# Patient Record
Sex: MALE | Race: WHITE | NOT HISPANIC OR LATINO | Employment: UNEMPLOYED | URBAN - METROPOLITAN AREA
[De-identification: names, ages, dates, MRNs, and addresses within clinical notes are randomized per-mention and may not be internally consistent; named-entity substitution may affect disease eponyms.]

---

## 2018-08-20 ENCOUNTER — HOSPITAL ENCOUNTER (EMERGENCY)
Facility: HOSPITAL | Age: 33
Discharge: LEFT BEFORE DISCHARGE | End: 2018-08-20

## 2018-08-20 VITALS
DIASTOLIC BLOOD PRESSURE: 87 MMHG | SYSTOLIC BLOOD PRESSURE: 129 MMHG | HEART RATE: 70 BPM | HEIGHT: 69 IN | TEMPERATURE: 98.4 F | BODY MASS INDEX: 35.55 KG/M2 | WEIGHT: 240 LBS | OXYGEN SATURATION: 100 % | RESPIRATION RATE: 20 BRPM

## 2024-05-28 ENCOUNTER — LAB (OUTPATIENT)
Dept: LAB | Facility: HOSPITAL | Age: 39
End: 2024-05-28
Payer: MEDICAID

## 2024-05-28 ENCOUNTER — OFFICE VISIT (OUTPATIENT)
Dept: FAMILY MEDICINE CLINIC | Facility: CLINIC | Age: 39
End: 2024-05-28
Payer: MEDICAID

## 2024-05-28 ENCOUNTER — TELEPHONE (OUTPATIENT)
Dept: FAMILY MEDICINE CLINIC | Facility: CLINIC | Age: 39
End: 2024-05-28
Payer: MEDICAID

## 2024-05-28 VITALS
HEIGHT: 69 IN | WEIGHT: 201 LBS | HEART RATE: 64 BPM | TEMPERATURE: 98.6 F | DIASTOLIC BLOOD PRESSURE: 74 MMHG | OXYGEN SATURATION: 98 % | SYSTOLIC BLOOD PRESSURE: 126 MMHG | BODY MASS INDEX: 29.77 KG/M2

## 2024-05-28 DIAGNOSIS — B19.20 HEPATITIS C VIRUS INFECTION WITHOUT HEPATIC COMA, UNSPECIFIED CHRONICITY: Primary | ICD-10-CM

## 2024-05-28 DIAGNOSIS — B19.20 HEPATITIS C VIRUS INFECTION WITHOUT HEPATIC COMA, UNSPECIFIED CHRONICITY: ICD-10-CM

## 2024-05-28 DIAGNOSIS — G62.9 NEUROPATHY: ICD-10-CM

## 2024-05-28 DIAGNOSIS — Z79.899 HIGH RISK MEDICATION USE: ICD-10-CM

## 2024-05-28 DIAGNOSIS — Z72.0 TOBACCO USE: ICD-10-CM

## 2024-05-28 LAB
DEPRECATED RDW RBC AUTO: 39.8 FL (ref 37–54)
ERYTHROCYTE [DISTWIDTH] IN BLOOD BY AUTOMATED COUNT: 12.6 % (ref 12.3–15.4)
HCT VFR BLD AUTO: 40.6 % (ref 37.5–51)
HGB BLD-MCNC: 13.9 G/DL (ref 13–17.7)
MCH RBC QN AUTO: 29.8 PG (ref 26.6–33)
MCHC RBC AUTO-ENTMCNC: 34.2 G/DL (ref 31.5–35.7)
MCV RBC AUTO: 86.9 FL (ref 79–97)
PLATELET # BLD AUTO: 187 10*3/MM3 (ref 140–450)
PMV BLD AUTO: 10.9 FL (ref 6–12)
RBC # BLD AUTO: 4.67 10*6/MM3 (ref 4.14–5.8)
WBC NRBC COR # BLD AUTO: 5.02 10*3/MM3 (ref 3.4–10.8)

## 2024-05-28 PROCEDURE — 80048 BASIC METABOLIC PNL TOTAL CA: CPT

## 2024-05-28 PROCEDURE — 1160F RVW MEDS BY RX/DR IN RCRD: CPT | Performed by: PHYSICIAN ASSISTANT

## 2024-05-28 PROCEDURE — 99204 OFFICE O/P NEW MOD 45 MIN: CPT | Performed by: PHYSICIAN ASSISTANT

## 2024-05-28 PROCEDURE — 87522 HEPATITIS C REVRS TRNSCRPJ: CPT

## 2024-05-28 PROCEDURE — 1159F MED LIST DOCD IN RCRD: CPT | Performed by: PHYSICIAN ASSISTANT

## 2024-05-28 PROCEDURE — 36415 COLL VENOUS BLD VENIPUNCTURE: CPT

## 2024-05-28 PROCEDURE — 84443 ASSAY THYROID STIM HORMONE: CPT

## 2024-05-28 PROCEDURE — 1126F AMNT PAIN NOTED NONE PRSNT: CPT | Performed by: PHYSICIAN ASSISTANT

## 2024-05-28 PROCEDURE — 80076 HEPATIC FUNCTION PANEL: CPT

## 2024-05-28 PROCEDURE — 82607 VITAMIN B-12: CPT

## 2024-05-28 PROCEDURE — 85027 COMPLETE CBC AUTOMATED: CPT

## 2024-05-28 RX ORDER — GABAPENTIN 400 MG/1
400 CAPSULE ORAL 3 TIMES DAILY
Qty: 90 CAPSULE | Refills: 2 | Status: SHIPPED | OUTPATIENT
Start: 2024-05-28

## 2024-05-28 RX ORDER — BUPRENORPHINE HYDROCHLORIDE AND NALOXONE HYDROCHLORIDE DIHYDRATE 8; 2 MG/1; MG/1
TABLET SUBLINGUAL
COMMUNITY
Start: 2024-05-14

## 2024-05-28 RX ORDER — HYDROXYZINE PAMOATE 50 MG/1
CAPSULE ORAL
COMMUNITY
Start: 2024-05-24

## 2024-05-28 RX ORDER — UREA 10 %
LOTION (ML) TOPICAL
COMMUNITY
Start: 2024-05-24

## 2024-05-28 NOTE — PROGRESS NOTES
New Patient Office Visit    Date: 2024   Patient Name: Armaan Ivy  : 1985   MRN: [id number]     Chief Complaint:    Chief Complaint   Patient presents with    Establish Care    Hepatitis C     Blood work from the sober living    Liver Eval     Blood work from the sober living       History of Present Illness: Armaan Ivy is a 39 y.o. male who is here today to establish care.    History of Present Illness  The patient comes in today to get established.     The patient acknowledges having undergone blood work during his time at [facility], however, he has not yet undergone a liver or cancer screening. He was diagnosed with Hepatitis C and was undergoing treatment, which was discontinued approximately a month ago. This is his third episode of Hepatitis C, with the initial two episodes being asymptomatic. Currently, he experiences severe itching, fatigue, lethargy, and nausea.    The patient, a  for 17 years, reports persistent knee pain. He has previously undergone surgery on his knee and has attended physical therapy classes. He experiences daily pain, which he attributes to his long-standing arthritis. He has been on Suboxone for the past 2 months. He occasionally experiences difficulty breathing, but his appetite remains unaffected. He has not observed any jaundice. He occasionally experiences swelling. He was previously prescribed gabapentin 400 mg thrice daily following his carpal tunnel injections and his knee surgery, which alleviated the nerve pain. However, he continues to experience pain in his elbow, which radiates up his arm, and his hand frequently falls asleep. He did have EMG studies as well.     Currently in sober living, and has been on Suboxone for 2 months. He states he is doing well with the suboxone, it controls his cravings for heroin. Also used fentanyl in the past.    Subjective    Review of Systems:   Review of Systems     Past Medical History:   Past  "Medical History:   Diagnosis Date    Anxiety     Depression     Hepatitis C antibody positive in blood     Swimmer's ear        Past Surgical History:   Past Surgical History:   Procedure Laterality Date    KNEE SURGERY Left        Family History:   Family History   Problem Relation Age of Onset    COPD Mother     Hypertension Father     Heart disease Father     Cancer Father     Obesity Sister     Diabetes Maternal Grandfather     Diabetes Paternal Grandmother     Cancer Paternal Grandmother        Social History:   Social History     Socioeconomic History    Marital status:    Tobacco Use    Smoking status: Every Day     Current packs/day: 1.00     Average packs/day: 1 pack/day for 28.4 years (28.4 ttl pk-yrs)     Types: Cigarettes     Start date: 1996    Smokeless tobacco: Never   Vaping Use    Vaping status: Every Day    Start date: 4/8/2024    Substances: Nicotine    Devices: Disposable   Substance and Sexual Activity    Alcohol use: Not Currently    Drug use: Yes     Types: Methamphetamines, Codeine, \"Crack\" cocaine    Sexual activity: Defer       Medications:     Current Outpatient Medications:     buprenorphine-naloxone (SUBOXONE) 8-2 MG per SL tablet, , Disp: , Rfl:     hydrOXYzine pamoate (VISTARIL) 50 MG capsule, , Disp: , Rfl:     ibuprofen (ADVIL,MOTRIN) 800 MG tablet, Take 1 tablet by mouth Every 6 (Six) Hours As Needed for Mild Pain., Disp: , Rfl:     melatonin 5 MG tablet tablet, , Disp: , Rfl:     gabapentin (NEURONTIN) 400 MG capsule, Take 1 capsule by mouth 3 (Three) Times a Day., Disp: 90 capsule, Rfl: 2    Allergies:   Allergies   Allergen Reactions    Codeine GI Intolerance    Mobic [Meloxicam] Diarrhea    Robaxin [Methocarbamol] Diarrhea       Objective   Vital Signs:   Vitals:    05/28/24 1348   BP: 126/74   Pulse: 64   Temp: 98.6 °F (37 °C)   TempSrc: Infrared   SpO2: 98%   Weight: 91.2 kg (201 lb)   Height: 175.3 cm (69.02\")   PainSc: 0-No pain     Body mass index is 29.67 kg/m².    "       Physical Exam:   Physical Exam  Vitals and nursing note reviewed.   Constitutional:       Appearance: Normal appearance.   HENT:      Head: Normocephalic and atraumatic.      Right Ear: Tympanic membrane and ear canal normal.      Left Ear: Tympanic membrane and ear canal normal.      Nose: No congestion or rhinorrhea.      Mouth/Throat:      Mouth: Mucous membranes are moist.      Pharynx: Oropharynx is clear. No posterior oropharyngeal erythema.   Cardiovascular:      Rate and Rhythm: Normal rate and regular rhythm.   Pulmonary:      Effort: Pulmonary effort is normal.      Breath sounds: Normal breath sounds. No decreased breath sounds, wheezing, rhonchi or rales.   Abdominal:      General: Bowel sounds are normal.      Palpations: Abdomen is soft. There is no hepatomegaly or splenomegaly.      Tenderness: There is no abdominal tenderness. There is no guarding or rebound.   Musculoskeletal:      Cervical back: Neck supple.      Right lower leg: No edema.      Left lower leg: No edema.   Lymphadenopathy:      Cervical: No cervical adenopathy.   Neurological:      Mental Status: He is alert.          Procedures     Assessment / Plan    Assessment/Plan:   Diagnoses and all orders for this visit:    1. Hepatitis C virus infection without hepatic coma, unspecified chronicity (Primary)  -     Hepatic Function Panel; Future  -     Hepatitis C RNA, Quantitative, PCR (graph); Future  -     TSH; Future  -     Basic metabolic panel; Future  -     CBC No Differential; Future  -     Vitamin B12; Future  -     Ambulatory Referral to Gastroenterology    2. Tobacco use    3. Neuropathy  -     gabapentin (NEURONTIN) 400 MG capsule; Take 1 capsule by mouth 3 (Three) Times a Day.  Dispense: 90 capsule; Refill: 2    4. High risk medication use  -     Compliance Drug Analysis, Ur - Urine, Clean Catch; Future       Assessment & Plan  1. Establishment of care.  A urine drug screen was conducted today.    2. Hepatitis  C.  Laboratory tests will be conducted to assess the status of the patient's hepatitis C, and a referral to gastroenterology for further Hepatitis C treatment will be initiated.    3. Neuropathy.  The patient will be recommenced on gabapentin to manage his neuropathy, a consequence of carpal tunnel issues and knee surgery.    Follow-up  The patient is scheduled for a follow-up visit in 3 months.     IVAN reviewed and is appropriate. UDS done today. CSA signed. Side effects and benefits of medications discussed, including risks of dizziness, falls, sleepiness, respiratory depression, overdose and death. He will inform his sober living counselors of medication use. He continues to have frequent drugs screens there as well as part of his suboxone treatment.     Follow Up:   Return in about 3 months (around 8/28/2024) for medication, Recheck.    Patient or patient representative verbalized consent for the use of Ambient Listening during the visit with  Katharina Rodsa PA-C for chart documentation. 5/28/2024  15:04 EDT    Katharina Rodas PA-C   [facility]

## 2024-05-29 LAB
ALBUMIN SERPL-MCNC: 4.5 G/DL (ref 3.5–5.2)
ALP SERPL-CCNC: 45 U/L (ref 39–117)
ALT SERPL W P-5'-P-CCNC: 107 U/L (ref 1–41)
ANION GAP SERPL CALCULATED.3IONS-SCNC: 11 MMOL/L (ref 5–15)
AST SERPL-CCNC: 66 U/L (ref 1–40)
BILIRUB CONJ SERPL-MCNC: <0.2 MG/DL (ref 0–0.3)
BILIRUB INDIRECT SERPL-MCNC: ABNORMAL MG/DL
BILIRUB SERPL-MCNC: 0.4 MG/DL (ref 0–1.2)
BUN SERPL-MCNC: 14 MG/DL (ref 6–20)
BUN/CREAT SERPL: 17.1 (ref 7–25)
CALCIUM SPEC-SCNC: 9.3 MG/DL (ref 8.6–10.5)
CHLORIDE SERPL-SCNC: 102 MMOL/L (ref 98–107)
CO2 SERPL-SCNC: 25 MMOL/L (ref 22–29)
CREAT SERPL-MCNC: 0.82 MG/DL (ref 0.76–1.27)
EGFRCR SERPLBLD CKD-EPI 2021: 115.3 ML/MIN/1.73
GLUCOSE SERPL-MCNC: 99 MG/DL (ref 65–99)
POTASSIUM SERPL-SCNC: 4.8 MMOL/L (ref 3.5–5.2)
PROT SERPL-MCNC: 7.5 G/DL (ref 6–8.5)
SODIUM SERPL-SCNC: 138 MMOL/L (ref 136–145)
TSH SERPL DL<=0.05 MIU/L-ACNC: 2.04 UIU/ML (ref 0.27–4.2)
VIT B12 BLD-MCNC: 797 PG/ML (ref 211–946)

## 2024-05-30 ENCOUNTER — PATIENT ROUNDING (BHMG ONLY) (OUTPATIENT)
Dept: FAMILY MEDICINE CLINIC | Facility: CLINIC | Age: 39
End: 2024-05-30
Payer: MEDICAID

## 2024-05-31 LAB
HCV RNA SERPL NAA+PROBE-ACNC: NORMAL IU/ML
HCV RNA SERPL NAA+PROBE-LOG IU: 6.16 LOG10 IU/ML
TEST INFORMATION: NORMAL

## 2024-06-03 LAB — DRUGS UR: NORMAL

## 2024-07-08 NOTE — TELEPHONE ENCOUNTER
Caller: Nick Ivysilverio    Relationship: Self    Best call back number: [phone]     Requested Prescriptions:   Requested Prescriptions     Pending Prescriptions Disp Refills    ibuprofen (ADVIL,MOTRIN) 800 MG tablet       Sig: Take 1 tablet by mouth Every 6 (Six) Hours As Needed for Mild Pain.        Pharmacy where request should be sent: [facility] [address] [facility] [phone] [facility] [phone] FX     Last office visit with prescribing clinician: 5/28/2024   Last telemedicine visit with prescribing clinician: Visit date not found   Next office visit with prescribing clinician: Visit date not found     Does the patient have less than a 3 day supply:  [x] Yes  [] No    Would you like a call back once the refill request has been completed: [x] Yes [] No    If the office needs to give you a call back, can they leave a voicemail: [x] Yes [] No    Wilbur Sanders Rep   07/08/24 16:59 EDT

## 2024-07-09 RX ORDER — IBUPROFEN 800 MG/1
800 TABLET ORAL EVERY 8 HOURS PRN
Qty: 100 TABLET | Refills: 3 | Status: SHIPPED | OUTPATIENT
Start: 2024-07-09

## 2024-08-30 ENCOUNTER — OFFICE VISIT (OUTPATIENT)
Dept: FAMILY MEDICINE CLINIC | Facility: CLINIC | Age: 39
End: 2024-08-30
Payer: MEDICAID

## 2024-08-30 VITALS
HEIGHT: 69 IN | WEIGHT: 243.5 LBS | DIASTOLIC BLOOD PRESSURE: 70 MMHG | RESPIRATION RATE: 15 BRPM | SYSTOLIC BLOOD PRESSURE: 110 MMHG | HEART RATE: 76 BPM | OXYGEN SATURATION: 98 % | BODY MASS INDEX: 36.07 KG/M2

## 2024-08-30 DIAGNOSIS — Z00.00 ANNUAL PHYSICAL EXAM: Primary | ICD-10-CM

## 2024-08-30 DIAGNOSIS — Z23 NEED FOR VACCINATION: ICD-10-CM

## 2024-08-30 DIAGNOSIS — Z01.89 PATIENT REQUEST FOR DIAGNOSTIC TESTING: ICD-10-CM

## 2024-08-30 PROBLEM — T84.89XA ACL GRAFT TEAR: Status: ACTIVE | Noted: 2017-02-06

## 2024-08-30 PROBLEM — M25.539 WRIST PAIN: Status: ACTIVE | Noted: 2018-03-19

## 2024-08-30 PROBLEM — A41.9 SEPSIS: Status: ACTIVE | Noted: 2019-08-12

## 2024-08-30 LAB
BUPRENORPHINE: POSITIVE
MDMA: NEGATIVE
MORPHINE/OPIATES SCREEN, URINE: NEGATIVE
PCP: NEGATIVE
POC AMPHETAMINES: NEGATIVE
POC BENZODIAZEPHINES: NEGATIVE
POC COCAINE: NEGATIVE
POC METHADONE: NEGATIVE
POC METHAMPHETAMINE SCREEN URINE: NEGATIVE
POC OPIATES: NEGATIVE
POC OXYCODONE: NEGATIVE
POC THC: NEGATIVE

## 2024-08-30 PROCEDURE — 2014F MENTAL STATUS ASSESS: CPT

## 2024-08-30 PROCEDURE — 36415 COLL VENOUS BLD VENIPUNCTURE: CPT

## 2024-08-30 PROCEDURE — 90460 IM ADMIN 1ST/ONLY COMPONENT: CPT

## 2024-08-30 PROCEDURE — 1159F MED LIST DOCD IN RCRD: CPT

## 2024-08-30 PROCEDURE — 99385 PREV VISIT NEW AGE 18-39: CPT

## 2024-08-30 PROCEDURE — 1126F AMNT PAIN NOTED NONE PRSNT: CPT

## 2024-08-30 PROCEDURE — 1160F RVW MEDS BY RX/DR IN RCRD: CPT

## 2024-08-30 PROCEDURE — 90715 TDAP VACCINE 7 YRS/> IM: CPT

## 2024-08-30 RX ORDER — QUETIAPINE FUMARATE 100 MG/1
100 TABLET, FILM COATED ORAL NIGHTLY
COMMUNITY
Start: 2024-08-26

## 2024-08-30 NOTE — PATIENT INSTRUCTIONS

## 2024-08-30 NOTE — PROGRESS NOTES
"     New Patient Office Visit      Date: 24   Patient Name: Armaan Ivy  : 1985   MRN: [id number]     Chief Complaint   Patient presents with    [facility] Care    Med Refill     Patient would like to discuss all medications. Patient would like to do a UDS as he has been told the ones he has done in another clinic were positive.        History of Present Illness: Armaan Ivy is a 39 y.o. male who is here today for a get acquainted visit to establish care with a new provider. I have reviewed the patient's medical history and updated the computerized patient record. Baseline screening tests were discussed today and pt agrees to discuss health maintenance and goals in future appointments.    HPI Notes:  Never had PCP  Pt is on probation, living at [facility]  Released this past Friday from [facility] to [facility] - Buprenorphine  Pt wants a urine drug screen today to take to [facility]  Denies current drug use, denies alcohol  Drinks only Mt Dew during the day - doesn't drink water r/t \"it's contaminated\"     Subjective     Past Medical History:   Diagnosis Date    Anxiety     Depression     Hepatitis C antibody positive in blood     Swimmer's ear        Past Surgical History:   Procedure Laterality Date    KNEE SURGERY Left        Family History   Problem Relation Age of Onset    COPD Mother     Hypertension Father     Heart disease Father     Cancer Father     Obesity Sister     Diabetes Maternal Grandfather     Diabetes Paternal Grandmother     Cancer Paternal Grandmother        Social History     Socioeconomic History    Marital status:    Tobacco Use    Smoking status: Every Day     Current packs/day: 1.00     Average packs/day: 1 pack/day for 28.7 years (28.7 ttl pk-yrs)     Types: Cigarettes     Start date:     Smokeless tobacco: Never   Vaping Use    Vaping status: Every Day    Start date: 2024    Substances: Nicotine    Devices: Disposable " "  Substance and Sexual Activity    Alcohol use: Not Currently    Drug use: Yes     Types: Methamphetamines, Codeine, \"Crack\" cocaine    Sexual activity: Defer       Current Outpatient Medications:     buprenorphine-naloxone (SUBOXONE) 8-2 MG per SL tablet, , Disp: , Rfl:     gabapentin (NEURONTIN) 400 MG capsule, Take 1 capsule by mouth 3 (Three) Times a Day., Disp: 90 capsule, Rfl: 2    hydrOXYzine pamoate (VISTARIL) 50 MG capsule, , Disp: , Rfl:     ibuprofen (ADVIL,MOTRIN) 800 MG tablet, Take 1 tablet by mouth Every 8 (Eight) Hours As Needed for Mild Pain or Moderate Pain., Disp: 100 tablet, Rfl: 3    melatonin 5 MG tablet tablet, , Disp: , Rfl:     QUEtiapine (SEROquel) 100 MG tablet, Take 1 tablet by mouth Every Night., Disp: , Rfl:       Allergies   Allergen Reactions    Codeine GI Intolerance    Mobic [Meloxicam] Diarrhea    Robaxin [Methocarbamol] Diarrhea       Objective     Vitals:    08/30/24 1129   BP: 110/70   BP Location: Left arm   Patient Position: Sitting   Cuff Size: Large Adult   Pulse: 76   Resp: 15   SpO2: 98%   Weight: 110 kg (243 lb 8 oz)   Height: 175.3 cm (69.02\")          Physical Exam  Vitals and nursing note reviewed.   Constitutional:       General: He is not in acute distress.     Appearance: He is not ill-appearing.   HENT:      Head: Normocephalic.      Right Ear: Tympanic membrane normal.      Left Ear: Tympanic membrane normal.      Nose: Nose normal.      Mouth/Throat:      Mouth: Mucous membranes are moist.      Pharynx: No oropharyngeal exudate or posterior oropharyngeal erythema.   Eyes:      Pupils: Pupils are equal, round, and reactive to light.   Cardiovascular:      Rate and Rhythm: Normal rate and regular rhythm.      Pulses: Normal pulses.      Heart sounds: Normal heart sounds. No murmur heard.  Pulmonary:      Effort: Pulmonary effort is normal.      Breath sounds: Normal breath sounds.   Abdominal:      General: Bowel sounds are normal.      Palpations: Abdomen is soft. "      Tenderness: There is no abdominal tenderness.   Musculoskeletal:         General: Normal range of motion.      Cervical back: Normal range of motion and neck supple. No tenderness.      Right lower leg: No edema.      Left lower leg: No edema.   Lymphadenopathy:      Cervical: No cervical adenopathy.   Skin:     General: Skin is warm and dry.      Capillary Refill: Capillary refill takes less than 2 seconds.   Neurological:      General: No focal deficit present.      Mental Status: He is alert and oriented to person, place, and time.      Gait: Gait normal.   Psychiatric:         Attention and Perception: Attention normal.         Mood and Affect: Mood normal.         Speech: Speech is rapid and pressured.         Behavior: Behavior normal. Behavior is hyperactive.         Thought Content: Thought content does not include suicidal ideation.         Cognition and Memory: Cognition normal.         Judgment: Judgment is impulsive.     PHQ-9 Total Score: 0     Assessment / Plan      Diagnoses and all orders for this visit:    1. Annual physical exam (Primary)  Assessment & Plan:  Armaan Ivy presents today to establish care with a new provider and complete annual wellness exam.    Armaan's main concerns today are: Establishing care with PCP, obtaining urine drug screen  Available medical records reviewed with him today.   Appropriate diagnostic testing ordered.  Medication refills provided per patient request.  Patient agrees to follow-up in 2 weeks to discuss diagnostic testing results, discuss health goals,  and address care gaps.    Orders:  -     CBC Auto Differential  -     Comprehensive Metabolic Panel  -     Hemoglobin A1c  -     Lipid Panel  -     TSH Rfx On Abnormal To Free T4    2. Need for vaccination  -     Tdap Vaccine Greater Than or Equal To 8yo IM    3. Patient request for diagnostic testing  Assessment & Plan:  Patient mention failing recent drug screen at sober living house  Denies  illicit drug use  Patient requests UDS today-we discussed his purpose for seeking test  Discussed with patient my belief that point-of-care drug screen today would likely be of little benefit but patient insists  Point-of-care UDS completed at patient's request.    Orders:  -     POC Urine Drug Screen, Triage       Body mass index is 35.94 kg/m².     Patient Education:   Reviewed medications, potential side effects and signs and symptoms to report.   Discussed risk versus benefits of treatment plan with patient and/or family-including medications, labs and radiology that may be ordered.    Patient education materials attached to AVS related to Health Maintenance recommendations. These materials reviewed with patient during office visit today.   Addressed questions and concerns during visit. Patient and/or family verbalized understanding and agree with plan.   Instructed to call the office with any questions and report to ER with any life-threatening symptoms.     Return in about 2 weeks (around 9/13/2024) for Follow-up.    RABIA Dias (Libby) [facility]  [address] [place] [id number] [phone]    NOTE TO PATIENT: The 21st Century Cures Act makes medical notes like these available to patients in the interest of transparency. However, be advised this is a medical document. It is intended as peer to peer communication. It is written in medical language and may contain abbreviations or verbiage that are unfamiliar. It may appear blunt or direct. Medical documents are intended to carry relevant information, facts as evident, and the clinical opinion of the practitioner.      EMR Dragon/Transcription disclaimer:  Much of this encounter note is an electronic transcription of spoken language to printed text. Electronic transcription of spoken language may permit erroneous, or at times, nonsensical words or phrases to be inadvertently transcribed.  Although I have reviewed the note for such errors, some may still exist.

## 2024-08-30 NOTE — LETTER
Controlled Substance Prescribing Agreement          I, Armaan Ivy   1985 a patient of  Lupe Duarte APRN   at [facility] , have been informed that  individuals who are prescribed certain Controlled Substances including, but not limited to, narcotic pain medicines, stimulants, benzodiazepine tranquilizers, and barbiturate sedatives, can abuse those substances or may allow abuse by others, and have some risk of developing an addictive disorder or suffering a relapse of a prior addiction. Therefore, I have been informed that it is necessary to observe strict rules pertaining to their use, and I agree to follow the terms and procedures described in this Agreement as consideration for, and as a condition of, the willingness of the physician whose signature appears below to consider prescribing or to continue prescribing Controlled Substances to treat my pain.     1. I will inform my physician of any current or past substance abuse, or any current or past substance abuse of any immediate member of my immediate family.     2. I agree that I may be subject to a voluntary evaluation by psychologists and/or psychiatrists, possibly at my own expense, before any Controlled Substances will be prescribed to me. I agree that the need to be evaluated by psychologists and/or psychiatrists may be revisited every three (3) to six (6) months thereafter while taking the medication.     3. All Controlled Substances must come from a provider in the PROVIDER’S PRACTICE. My Controlled Substances will come from the PROVIDER whose signature appears below, or during his or her absence, by the covering provider, unless specific written authorization is obtained from the office for an exception.     4. I will obtain all Controlled Substances from the same pharmacy. Should the need arise to change pharmacies, I will inform the PROVIDER’S office.     5. I will inform the PROVIDER’S office of  any new medications or medical conditions, and of any adverse effects I experience from any of the medications that I take.     6. I will inform my other health care providers that I am taking the Controlled Substances listed above, and of the existence of this Agreement. In the event of an emergency, I will provide the foregoing information to emergency department providers.     7. I agree that my prescribing PROVIDER has permission to discuss all diagnostic and treatment details with other health care providers, pharmacists, or other professionals who provide my health care regarding my use of Controlled Substances for purposes of maintaining accountability.     8. I will not allow anyone else to have, use sell, or otherwise have access to these medications. The sharing of medications with anyone is absolutely forbidden and is against the law.         9. I understand that Controlled Substances may be hazardous or lethal to a person who is not tolerant to their effects, especially a child, and that I must keep them out of reach of such people for their own safety.     10. I understand that tampering with a written prescription is a felony and I will not change or tamper with the PROVIDER’S written prescription.     11. I am aware that attempting to obtain a Controlled Substance under false pretenses is illegal.     12. I agree not to alter my medication in any way, and I will take my medication whole, and it will not be broken, chewed, crushed, injected, or snorted.     13. I will take my medication as instructed and prescribed, and I will not exceed the maximum prescribed dose. Any change in dosage must be approved by the PROVIDER or a physician within the PRACTICE.     14. I understand that these drugs should not be stopped abruptly, as withdrawal syndromes may develop.     15. I will cooperate with unannounced urine or serum toxicology screenings as may be requested, as well as any random pill counts of  medication by the PROVIDER. Failure to comply may result in termination of the PROVIDER-patient relationship.     16. I understand that the presence of unauthorized and/or illegal substances in the screenings described in the paragraph above may prompt referral for assessment for a substance abuse disorder or termination of the PROVIDER-patient relationship.     17. I understand that medications may not be replaced if they are lost, damaged, or stolen. If any of these situations arise that cause me to request an early refill of my medication, a copy of a filed police report or a statement from me explaining the circumstances may be required before additional prescriptions are considered. If I request an early refill secondary to lost, damaged, or stolen prescriptions twice within a year, I may be discharged from the practice.     18. I understand that a prescription may be given early if the PROVIDER or the patient will be out of town when the refill is due. These prescriptions will contain instructions to the pharmacist that the prescriptions(s) may not be filled prior to the appropriate date.     19. If the responsible legal authorities have questions concerning my treatment, as may occur, for example, if I obtained medication at several pharmacies, all confidentiality is waived, and these authorities may be given full access to my full records of Controlled Substances administration.     20. I will keep my scheduled appointments in order to receive medication renewals. If I need to cancel my appointment, I will do so a minimum of twenty-four (24) hours before it is scheduled.     21. I understand that I may be asked to bring my medications in their original container to the PROVIDER’s office while I am on controlled medication.     22. Refills generally will not be given over the phone, after office hours, during the weekends, and on holidays.     23. I understand that any medical treatment is initially a trial,  with the goal of treatment being to improve the quality of life and ability to function and/or work. These parameters will be assessed periodically to determine the benefits of continued therapy, and continued prescription is contingent on whether my physician believes that the medication usage benefits me. I will comply with all treatments as outlined by the PROVIDER.     24. I have been explained the risks and potential benefits of these therapies, including, but not limited to, psychological addiction, physical dependence, withdrawal and over dosage.     25. I understand that failure to adhere to these policies and/or failure to comply with the PROVIDER’S treatment plan may result in cessation of therapy with Controlled Substance prescribing by the PROVIDER or referral for further specialty assessment, as well as possible discharge from the PRACTICE.     26. I, the undersigned patient, attest that the foregoing was discussed with me, and that I have read, fully understand, and agree to all of the above requirements and instructions. I affirm that I have the full right and power to sign and be bound by this  Agreement.         Date:  __________________________________________    Patient Printed Name:  _____________________________    Patient Signature:  ________________________________           Date:  __________________________________________    Provider Signature:  _______________________________

## 2024-08-31 LAB
ALBUMIN SERPL-MCNC: 4.2 G/DL (ref 4.1–5.1)
ALP SERPL-CCNC: 45 IU/L (ref 44–121)
ALT SERPL-CCNC: 61 IU/L (ref 0–44)
AST SERPL-CCNC: 44 IU/L (ref 0–40)
BASOPHILS # BLD AUTO: 0 X10E3/UL (ref 0–0.2)
BASOPHILS NFR BLD AUTO: 1 %
BILIRUB SERPL-MCNC: 0.5 MG/DL (ref 0–1.2)
BUN SERPL-MCNC: 14 MG/DL (ref 6–20)
BUN/CREAT SERPL: 18 (ref 9–20)
CALCIUM SERPL-MCNC: 9.2 MG/DL (ref 8.7–10.2)
CHLORIDE SERPL-SCNC: 106 MMOL/L (ref 96–106)
CHOLEST SERPL-MCNC: 141 MG/DL (ref 100–199)
CO2 SERPL-SCNC: 23 MMOL/L (ref 20–29)
CREAT SERPL-MCNC: 0.8 MG/DL (ref 0.76–1.27)
EGFRCR SERPLBLD CKD-EPI 2021: 116 ML/MIN/1.73
EOSINOPHIL # BLD AUTO: 0.2 X10E3/UL (ref 0–0.4)
EOSINOPHIL NFR BLD AUTO: 4 %
ERYTHROCYTE [DISTWIDTH] IN BLOOD BY AUTOMATED COUNT: 12.6 % (ref 11.6–15.4)
GLOBULIN SER CALC-MCNC: 3.2 G/DL (ref 1.5–4.5)
GLUCOSE SERPL-MCNC: 105 MG/DL (ref 70–99)
HBA1C MFR BLD: 5.1 % (ref 4.8–5.6)
HCT VFR BLD AUTO: 40.6 % (ref 37.5–51)
HDLC SERPL-MCNC: 33 MG/DL
HGB BLD-MCNC: 13.3 G/DL (ref 13–17.7)
IMM GRANULOCYTES # BLD AUTO: 0 X10E3/UL (ref 0–0.1)
IMM GRANULOCYTES NFR BLD AUTO: 0 %
LDLC SERPL CALC-MCNC: 85 MG/DL (ref 0–99)
LYMPHOCYTES # BLD AUTO: 1.4 X10E3/UL (ref 0.7–3.1)
LYMPHOCYTES NFR BLD AUTO: 29 %
MCH RBC QN AUTO: 28.9 PG (ref 26.6–33)
MCHC RBC AUTO-ENTMCNC: 32.8 G/DL (ref 31.5–35.7)
MCV RBC AUTO: 88 FL (ref 79–97)
MONOCYTES # BLD AUTO: 0.4 X10E3/UL (ref 0.1–0.9)
MONOCYTES NFR BLD AUTO: 9 %
NEUTROPHILS # BLD AUTO: 2.7 X10E3/UL (ref 1.4–7)
NEUTROPHILS NFR BLD AUTO: 57 %
PLATELET # BLD AUTO: 191 X10E3/UL (ref 150–450)
POTASSIUM SERPL-SCNC: 4.7 MMOL/L (ref 3.5–5.2)
PROT SERPL-MCNC: 7.4 G/DL (ref 6–8.5)
RBC # BLD AUTO: 4.61 X10E6/UL (ref 4.14–5.8)
SODIUM SERPL-SCNC: 141 MMOL/L (ref 134–144)
TRIGL SERPL-MCNC: 128 MG/DL (ref 0–149)
TSH SERPL DL<=0.005 MIU/L-ACNC: 2.41 UIU/ML (ref 0.45–4.5)
VLDLC SERPL CALC-MCNC: 23 MG/DL (ref 5–40)
WBC # BLD AUTO: 4.7 X10E3/UL (ref 3.4–10.8)

## 2024-09-03 ENCOUNTER — TELEPHONE (OUTPATIENT)
Dept: FAMILY MEDICINE CLINIC | Facility: CLINIC | Age: 39
End: 2024-09-03

## 2024-09-03 ENCOUNTER — TELEPHONE (OUTPATIENT)
Dept: FAMILY MEDICINE CLINIC | Facility: CLINIC | Age: 39
End: 2024-09-03
Payer: MEDICAID

## 2024-09-03 NOTE — TELEPHONE ENCOUNTER
PATIENT CALLING TO SEE IF REFERRAL CAN BE SENT ASAP. THE REFERRAL OFFICE HAS AN OPENING ON FRIDAY.

## 2024-09-03 NOTE — TELEPHONE ENCOUNTER
Caller: Toya Ivy    Relationship: Self    Best call back number     Caller requesting test results: TOYA    What test was performed: LABS    When was the test performed: [id number]    Where was the test performed: [facility]    Additional notes: PLEASE CALL WITH RESULTS AND PATIENT ALSO WOULD LIKE A COPY OF THESE RESULTS AND HE CAN

## 2024-09-03 NOTE — TELEPHONE ENCOUNTER
Caller: Armaan Ivy    Relationship: Self    Best call back number:  [phone]    What is the medical concern/diagnosis: LEFT KNEE PAIN    What specialty or service is being requested:  ORTHOPEDICS    What is the provider, practice or medical service name: What is the office location: [facility]    What is the office phone number: [phone]    Any additional details: THIS IS OFFICE JUST NEEDING A REFERRAL FROM [facility]

## 2024-09-06 ENCOUNTER — TELEPHONE (OUTPATIENT)
Dept: FAMILY MEDICINE CLINIC | Facility: CLINIC | Age: 39
End: 2024-09-06
Payer: MEDICAID

## 2024-09-06 NOTE — TELEPHONE ENCOUNTER
Tried to call patient and advised that we could print a copy of his labs for him and to advise that we will need to discuss the referral to ortho at his next appointment. Could not leave message.

## 2024-09-06 NOTE — TELEPHONE ENCOUNTER
PATIENT CAME TO OFFICE AND WANTED A PRINTED COPY OF HIS LAB RESULTS BECAUSE HIS MYCHART IS MESSED UP. I ADVISED PATIENT THEY WERE NOT SIGNED OFF ON YET

## 2024-09-08 PROBLEM — Z01.89 PATIENT REQUEST FOR DIAGNOSTIC TESTING: Status: ACTIVE | Noted: 2024-09-08

## 2024-09-08 PROBLEM — Z00.00 ANNUAL PHYSICAL EXAM: Status: ACTIVE | Noted: 2024-09-08

## 2024-09-08 PROBLEM — Z23 NEED FOR VACCINATION: Status: ACTIVE | Noted: 2024-09-08

## 2024-09-09 NOTE — ASSESSMENT & PLAN NOTE
Patient mention failing recent drug screen at sober [facility]  Denies illicit drug use  Patient requests UDS today-we discussed his purpose for seeking test  Discussed with patient my belief that point-of-care drug screen today would likely be of little benefit but patient insists  Point-of-care UDS completed at patient's request.

## 2024-09-09 NOTE — ASSESSMENT & PLAN NOTE
Armaan Ivy presents today to establish care with a new provider and complete annual wellness exam.    Armaan's main concerns today are: Establishing care with PCP, obtaining urine drug screen  Available medical records reviewed with him today.   Appropriate diagnostic testing ordered.  Medication refills provided per patient request.  Patient agrees to follow-up in 2 weeks to discuss diagnostic testing results, discuss health goals,  and address care gaps.

## 2024-09-17 ENCOUNTER — OFFICE VISIT (OUTPATIENT)
Dept: FAMILY MEDICINE CLINIC | Facility: CLINIC | Age: 39
End: 2024-09-17
Payer: MEDICAID

## 2024-09-17 VITALS
DIASTOLIC BLOOD PRESSURE: 82 MMHG | HEIGHT: 69 IN | WEIGHT: 248.8 LBS | RESPIRATION RATE: 18 BRPM | OXYGEN SATURATION: 97 % | HEART RATE: 79 BPM | BODY MASS INDEX: 36.85 KG/M2 | SYSTOLIC BLOOD PRESSURE: 138 MMHG

## 2024-09-17 DIAGNOSIS — G62.9 NEUROPATHY: Primary | ICD-10-CM

## 2024-09-17 DIAGNOSIS — Z79.899 HIGH RISK MEDICATION USE: ICD-10-CM

## 2024-09-17 LAB
BUPRENORPHINE SAL CFM-MCNC: POSITIVE NG/ML
PCP: NEGATIVE
POC AMPHETAMINES: NEGATIVE
POC BARBITURATES: NEGATIVE
POC BENZODIAZEPHINES: NEGATIVE
POC COCAINE: NEGATIVE
POC METHADONE: NEGATIVE
POC METHAMPHETAMINE SCREEN URINE: NEGATIVE
POC OPIATES: NEGATIVE
POC OXYCODONE: NEGATIVE
POC THC: NEGATIVE

## 2024-09-17 PROCEDURE — 80305 DRUG TEST PRSMV DIR OPT OBS: CPT

## 2024-09-17 PROCEDURE — 1126F AMNT PAIN NOTED NONE PRSNT: CPT

## 2024-09-17 PROCEDURE — 99213 OFFICE O/P EST LOW 20 MIN: CPT

## 2024-09-17 PROCEDURE — 1160F RVW MEDS BY RX/DR IN RCRD: CPT

## 2024-09-17 PROCEDURE — 1159F MED LIST DOCD IN RCRD: CPT

## 2024-09-17 RX ORDER — GABAPENTIN 400 MG/1
400 CAPSULE ORAL 3 TIMES DAILY
Qty: 90 CAPSULE | Refills: 2 | Status: CANCELLED | OUTPATIENT
Start: 2024-09-17

## 2024-09-17 NOTE — PROGRESS NOTES
"     Follow Up Office Visit      Date:  2024   Patient Name: Armaan Ivy  : 1985   MRN: [id number]     Chief Complaint   Patient presents with    Results     Follow up on labs.     Leg Pain     Patient wants another xray on his left leg, states it still hurts.     Med Refill     Patient states he needs refills on his gabapentin.      History of Present Illness: Armaan Ivy is a 39 y.o. male who is here today for follow up after establish care visit.  Patient is in sober living house, he is going to [facility] for Suboxone and has requested PCP refill Gabapentin 400 mg daily.  Patient denies any new illness or injury since last visit. Denies falls, unexplained weight change, fevers, chills, or other constitutional symptoms and has no additional questions or concens at this time.       Subjective      Past Medical History:   Diagnosis Date    Anxiety     Depression     Hepatitis C antibody positive in blood     Swimmer's ear        Past Surgical History:   Procedure Laterality Date    KNEE SURGERY Left        Family History   Problem Relation Age of Onset    COPD Mother     Hypertension Father     Heart disease Father     Cancer Father     Obesity Sister     Diabetes Maternal Grandfather     Diabetes Paternal Grandmother     Cancer Paternal Grandmother        Social History     Socioeconomic History    Marital status:    Tobacco Use    Smoking status: Every Day     Current packs/day: 1.00     Average packs/day: 1 pack/day for 28.7 years (28.7 ttl pk-yrs)     Types: Cigarettes     Start date:     Smokeless tobacco: Never   Vaping Use    Vaping status: Every Day    Start date: 2024    Substances: Nicotine    Devices: Disposable   Substance and Sexual Activity    Alcohol use: Not Currently    Drug use: Yes     Types: Methamphetamines, Codeine, \"Crack\" cocaine    Sexual activity: Defer       Current Outpatient Medications:     buprenorphine-naloxone (SUBOXONE) 8-2 MG per SL " "tablet, , Disp: , Rfl:     gabapentin (NEURONTIN) 400 MG capsule, Take 1 capsule by mouth 3 (Three) Times a Day., Disp: 90 capsule, Rfl: 2    hydrOXYzine pamoate (VISTARIL) 50 MG capsule, , Disp: , Rfl:     ibuprofen (ADVIL,MOTRIN) 800 MG tablet, Take 1 tablet by mouth Every 8 (Eight) Hours As Needed for Mild Pain or Moderate Pain., Disp: 100 tablet, Rfl: 3    QUEtiapine (SEROquel) 100 MG tablet, Take 1 tablet by mouth Every Night., Disp: , Rfl:     Allergies   Allergen Reactions    Codeine GI Intolerance    Mobic [Meloxicam] Diarrhea    Robaxin [Methocarbamol] Diarrhea       Objective     Physical Exam  Vitals and nursing note reviewed.   Constitutional:       General: He is not in acute distress.     Appearance: Normal appearance. He is not toxic-appearing.   HENT:      Head: Normocephalic.   Eyes:      Pupils: Pupils are equal, round, and reactive to light.   Cardiovascular:      Rate and Rhythm: Normal rate and regular rhythm.      Heart sounds: No murmur heard.  Pulmonary:      Effort: Pulmonary effort is normal. No respiratory distress.      Breath sounds: Normal breath sounds.   Musculoskeletal:         General: Normal range of motion.      Cervical back: Normal range of motion and neck supple.      Right lower leg: No edema.      Left lower leg: No edema.   Skin:     General: Skin is warm and dry.   Neurological:      Mental Status: He is alert and oriented to person, place, and time.   Psychiatric:         Mood and Affect: Mood normal.         Speech: Speech is rapid and pressured.         Behavior: Behavior normal.         Thought Content: Thought content does not include suicidal ideation.       Vitals:    09/17/24 1331   BP: 138/82   BP Location: Left arm   Patient Position: Sitting   Cuff Size: Large Adult   Pulse: 79   Resp: 18   SpO2: 97%   Weight: 113 kg (248 lb 12.8 oz)   Height: 175.3 cm (69.02\")     Body mass index is 36.72 kg/m².          The ASCVD Risk score (Moisés COLLADO, et al., 2019) failed to " calculate for the following reasons:    The 2019 ASCVD risk score is only valid for ages 40 to 79    Assessment / Plan      Diagnoses and all orders for this visit:    1. Neuropathy (Primary)  Assessment & Plan:  Patient complains of chronic pain related to multiple orthopedic injuries  History of carpal tunnel and chronic knee pain  Patient reports history of neuropathy, currently taking gabapentin 400 mg/day  Patient signed controlled substance agreement today and submitted to point-of-care UDS  Point-of-care UDS consistent with treatment regimen       2. High risk medication use  Assessment & Plan:  Point-of-care urine drug screen today  CSA completed and reviewed with patient  Patient currently taking Suboxone, Seroquel, and gabapentin  Most recent lab work completed on 8/30/2024 when patient discharged from inpatient rehab-reviewed these with patient today  No change in treatment regimen  Patient agrees to follow-up in 2 weeks unless otherwise indicated. We will re-evaluate at that time.    Orders:  -     POC Urine Drug Screen, Triage       Patient Education:   Reviewed medications, potential side effects and signs and symptoms to report.   Discussed risk vs benefits of treatment plan with patient including medications, labs, and imaging.    Patient education materials attached to AVS and reviewed with patient during office visit today.   Addressed questions and concerns during visit. Patient verbalized understanding and agrees with tx plan.   Instructed to call the office with any questions and report to ER with any life-threatening symptoms.    Return in about 2 weeks (around 10/1/2024).    [facility]  [address] [place] [id number] [phone]    NOTE TO PATIENT: The 21st Century Cures Act makes medical notes like these available to patients in the interest of transparency. However, be advised this is a medical  document. It is intended as peer to peer communication. It is written in medical language and may contain abbreviations or verbiage that are unfamiliar. It may appear blunt or direct. Medical documents are intended to carry relevant information, facts as evident, and the clinical opinion of the practitioner.     EMR Dragon/Transcription disclaimer:  Much of this encounter note is an electronic transcription of spoken language to printed text. Electronic transcription of spoken language may permit erroneous, or at times, nonsensical words or phrases to be inadvertently transcribed. Although I have reviewed the note for such errors, some may still exist.

## 2024-09-17 NOTE — LETTER
Controlled Substance Prescribing Agreement          I, Armaan Ivy [PATIENT],  1985 [] a patient of  Lupe Duarte APRN   [PROVIDER] at [facility] [PRACTICE], have been informed that  individuals who are prescribed certain Controlled Substances including, but not limited to, narcotic pain medicines, stimulants, benzodiazepine tranquilizers, and barbiturate sedatives, can abuse those substances or may allow abuse by others, and have some risk of developing an addictive disorder or suffering a relapse of a prior addiction. Therefore, I have been informed that it is necessary to observe strict rules pertaining to their use, and I agree to follow the terms and procedures described in this Agreement as consideration for, and as a condition of, the willingness of the physician whose signature appears below to consider prescribing or to continue prescribing Controlled Substances to treat my pain.     1. I will inform my physician of any current or past substance abuse, or any current or past substance abuse of any immediate member of my immediate family.     2. I agree that I may be subject to a voluntary evaluation by psychologists and/or psychiatrists, possibly at my own expense, before any Controlled Substances will be prescribed to me. I agree that the need to be evaluated by psychologists and/or psychiatrists may be revisited every three (3) to six (6) months thereafter while taking the medication.     3. All Controlled Substances must come from a provider in the PROVIDER’S PRACTICE. My Controlled Substances will come from the PROVIDER whose signature appears below, or during his or her absence, by the covering provider, unless specific written authorization is obtained from the office for an exception.     4. I will obtain all Controlled Substances from the same pharmacy. Should the need arise to change pharmacies, I will inform the PROVIDER’S office.     5. I  will inform the PROVIDER’S office of any new medications or medical conditions, and of any adverse effects I experience from any of the medications that I take.     6. I will inform my other health care providers that I am taking the Controlled Substances listed above, and of the existence of this Agreement. In the event of an emergency, I will provide the foregoing information to emergency department providers.     7. I agree that my prescribing PROVIDER has permission to discuss all diagnostic and treatment details with other health care providers, pharmacists, or other professionals who provide my health care regarding my use of Controlled Substances for purposes of maintaining accountability.     8. I will not allow anyone else to have, use sell, or otherwise have access to these medications. The sharing of medications with anyone is absolutely forbidden and is against the law.         9. I understand that Controlled Substances may be hazardous or lethal to a person who is not tolerant to their effects, especially a child, and that I must keep them out of reach of such people for their own safety.     10. I understand that tampering with a written prescription is a felony and I will not change or tamper with the PROVIDER’S written prescription.     11. I am aware that attempting to obtain a Controlled Substance under false pretenses is illegal.     12. I agree not to alter my medication in any way, and I will take my medication whole, and it will not be broken, chewed, crushed, injected, or snorted.     13. I will take my medication as instructed and prescribed, and I will not exceed the maximum prescribed dose. Any change in dosage must be approved by the PROVIDER or a physician within the PRACTICE.     14. I understand that these drugs should not be stopped abruptly, as withdrawal syndromes may develop.     15. I will cooperate with unannounced urine or serum toxicology screenings as may be requested, as well  as any random pill counts of medication by the PROVIDER. Failure to comply may result in termination of the PROVIDER-patient relationship.     16. I understand that the presence of unauthorized and/or illegal substances in the screenings described in the paragraph above may prompt referral for assessment for a substance abuse disorder or termination of the PROVIDER-patient relationship.     17. I understand that medications may not be replaced if they are lost, damaged, or stolen. If any of these situations arise that cause me to request an early refill of my medication, a copy of a filed police report or a statement from me explaining the circumstances may be required before additional prescriptions are considered. If I request an early refill secondary to lost, damaged, or stolen prescriptions twice within a year, I may be discharged from the practice.     18. I understand that a prescription may be given early if the PROVIDER or the patient will be out of town when the refill is due. These prescriptions will contain instructions to the pharmacist that the prescriptions(s) may not be filled prior to the appropriate date.     19. If the responsible legal authorities have questions concerning my treatment, as may occur, for example, if I obtained medication at several pharmacies, all confidentiality is waived, and these authorities may be given full access to my full records of Controlled Substances administration.     20. I will keep my scheduled appointments in order to receive medication renewals. If I need to cancel my appointment, I will do so a minimum of twenty-four (24) hours before it is scheduled.     21. I understand that I may be asked to bring my medications in their original container to the PROVIDER’s office while I am on controlled medication.     22. Refills generally will not be given over the phone, after office hours, during the weekends, and on holidays.     23. I understand that any medical  treatment is initially a trial, with the goal of treatment being to improve the quality of life and ability to function and/or work. These parameters will be assessed periodically to determine the benefits of continued therapy, and continued prescription is contingent on whether my physician believes that the medication usage benefits me. I will comply with all treatments as outlined by the PROVIDER.     24. I have been explained the risks and potential benefits of these therapies, including, but not limited to, psychological addiction, physical dependence, withdrawal and over dosage.     25. I understand that failure to adhere to these policies and/or failure to comply with the PROVIDER’S treatment plan may result in cessation of therapy with Controlled Substance prescribing by the PROVIDER or referral for further specialty assessment, as well as possible discharge from the PRACTICE.     26. I, the undersigned patient, attest that the foregoing was discussed with me, and that I have read, fully understand, and agree to all of the above requirements and instructions. I affirm that I have the full right and power to sign and be bound by this  Agreement.         Date:  __________________________________________    Time:  __________________________________________    Patient Printed Name:  _____________________________    Patient Signature:  ________________________________           Date:  __________________________________________    Time:  __________________________________________    Provider Signature:  _______________________________

## 2024-09-24 DIAGNOSIS — G62.9 NEUROPATHY: ICD-10-CM

## 2024-09-24 NOTE — TELEPHONE ENCOUNTER
Caller: Biju Murrayisaias    Relationship: Self    Best call back number: [phone]     Requested Prescriptions:   Requested Prescriptions     Pending Prescriptions Disp Refills    gabapentin (NEURONTIN) 400 MG capsule 90 capsule 2     Sig: Take 1 capsule by mouth 3 (Three) Times a Day.        Pharmacy where request should be sent: [facility] [address] [facility] [phone] [facility] [phone] FX     Last office visit with prescribing clinician: 9/17/2024   Last telemedicine visit with prescribing clinician: Visit date not found   Next office visit with prescribing clinician: 10/3/2024     Additional details provided by patient: PATIENT IS OUT.    Does the patient have less than a 3 day supply:  [x] Yes  [] No    Would you like a call back once the refill request has been completed: [] Yes [x] No    If the office needs to give you a call back, can they leave a voicemail: [] Yes [x] No    Cadance Dunaway, RegSched Rep   09/24/24 09:09 EDT

## 2024-09-27 PROBLEM — Z79.899 HIGH RISK MEDICATION USE: Status: ACTIVE | Noted: 2024-09-27

## 2024-09-27 PROBLEM — G62.9 NEUROPATHY: Status: ACTIVE | Noted: 2024-09-27

## 2024-09-27 NOTE — ASSESSMENT & PLAN NOTE
Patient complains of chronic pain related to multiple orthopedic injuries  History of carpal tunnel and chronic knee pain  Patient reports history of neuropathy, currently taking gabapentin 400 mg/day  Patient signed controlled substance agreement today and submitted to point-of-care UDS  Point-of-care UDS consistent with treatment regimen

## 2024-09-27 NOTE — ASSESSMENT & PLAN NOTE
Point-of-care urine drug screen today  CSA completed and reviewed with patient  Patient currently taking Suboxone, Seroquel, and gabapentin  Most recent lab work completed on 8/30/2024 when patient discharged from inpatient rehab-reviewed these with patient today  No change in treatment regimen  Patient agrees to follow-up in 2 weeks unless otherwise indicated. We will re-evaluate at that time.

## 2024-10-03 RX ORDER — GABAPENTIN 400 MG/1
400 CAPSULE ORAL 3 TIMES DAILY
Qty: 90 CAPSULE | Refills: 2 | OUTPATIENT
Start: 2024-10-03

## 2024-10-03 NOTE — TELEPHONE ENCOUNTER
This medication was filled by Dr. Duarte on 9/24/24 for 28-day fill, patient is not due for refill

## 2024-10-14 ENCOUNTER — TELEPHONE (OUTPATIENT)
Dept: FAMILY MEDICINE CLINIC | Facility: CLINIC | Age: 39
End: 2024-10-14

## 2024-10-14 NOTE — TELEPHONE ENCOUNTER
Caller: Armaan Ivy    Relationship: Self    Best call back number: [phone]     What orders are you requesting (i.e. lab or imaging): MRI OF LEGS     In what timeframe would the patient need to come in: AS SOON AS ABLE    Additional notes: PATIENT CALLED TO REQUEST AN MRI OF HIS LEGS (BOTH; SPECIFICALLY OF THE KNEES DOWN) BECAUSE FOR THE PAST 3 WEEKS OR SO, HE'S BEEN EXPERIENCING A PAIN ANYTIME HE STANDS UP FROM SITTING DOWN. THIS IS A CONSISTENT ISSUE THAT HAPPENS EACH TIME SHE STANDS UP    PLEASE ADVISE

## 2024-10-15 NOTE — TELEPHONE ENCOUNTER
PT CALLED TO CHECK STATUS FOR MRI REFERRAL, STATED THAT HE WANTED TO HAVE MRI DONE BEFORE HE GOES TO WORK TODAY.    PLEASE ADVISE.

## 2024-10-22 ENCOUNTER — TELEPHONE (OUTPATIENT)
Dept: FAMILY MEDICINE CLINIC | Facility: CLINIC | Age: 39
End: 2024-10-22
Payer: MEDICAID

## 2024-10-22 NOTE — TELEPHONE ENCOUNTER
HUB TO RELAY    CALLED PT , VM BOX NOT SET UP, UNABLE TO LEAVE MESSAGE. MEG SIL[facility]MAN IS NOT ABLE TO JUST PUT IN A REFERRAL FOR A MRI. PT WILL NEED TO BE SEEN IN OFFICE AT PROVIDERS NEXT AVAILABLE TO DETERMINE IF A MRI IS NECESSARY

## 2024-10-23 ENCOUNTER — TELEPHONE (OUTPATIENT)
Dept: FAMILY MEDICINE CLINIC | Facility: CLINIC | Age: 39
End: 2024-10-23
Payer: MEDICAID

## 2024-10-23 RX ORDER — HYDROXYZINE PAMOATE 50 MG/1
50 CAPSULE ORAL 2 TIMES DAILY PRN
Qty: 60 CAPSULE | Refills: 0 | Status: SHIPPED | OUTPATIENT
Start: 2024-10-23

## 2024-10-23 RX ORDER — IBUPROFEN 800 MG/1
800 TABLET, FILM COATED ORAL EVERY 8 HOURS PRN
Qty: 100 TABLET | Refills: 0 | Status: SHIPPED | OUTPATIENT
Start: 2024-10-23

## 2024-10-23 NOTE — TELEPHONE ENCOUNTER
Caller: Nick Ivysilverio    Relationship: Self    Best call back number: [phone]     Requested Prescriptions:   Requested Prescriptions     Pending Prescriptions Disp Refills    hydrOXYzine pamoate (VISTARIL) 50 MG capsule      ibuprofen (ADVIL,MOTRIN) 800 MG tablet 100 tablet 3     Sig: Take 1 tablet by mouth Every 8 (Eight) Hours As Needed for Mild Pain or Moderate Pain.        Pharmacy where request should be sent: [facility] [address] [phone] [facility] [phone] FX     Last office visit with prescribing clinician: 9/17/2024   Last telemedicine visit with prescribing clinician: Visit date not found   Next office visit with prescribing clinician: Visit date not found     Additional details provided by patient: OUT OF MEDICATION     Does the patient have less than a 3 day supply:  [x] Yes  [] No    Would you like a call back once the refill request has been completed: [] Yes [x] No    If the office needs to give you a call back, can they leave a voicemail: [] Yes [x] No    Wilbur Suárez Rep   10/23/24 08:18 EDT   
English

## 2024-10-23 NOTE — TELEPHONE ENCOUNTER
Message sent to patient about making and keeping appointment in order to get more refills on medications

## 2025-02-17 ENCOUNTER — TELEPHONE (OUTPATIENT)
Dept: FAMILY MEDICINE CLINIC | Facility: CLINIC | Age: 40
End: 2025-02-17

## 2025-02-17 DIAGNOSIS — G62.9 NEUROPATHY: ICD-10-CM

## 2025-02-21 ENCOUNTER — OFFICE VISIT (OUTPATIENT)
Dept: FAMILY MEDICINE CLINIC | Facility: CLINIC | Age: 40
End: 2025-02-21
Payer: MEDICAID

## 2025-02-21 VITALS
WEIGHT: 276.1 LBS | SYSTOLIC BLOOD PRESSURE: 120 MMHG | TEMPERATURE: 98 F | OXYGEN SATURATION: 98 % | RESPIRATION RATE: 18 BRPM | HEART RATE: 83 BPM | DIASTOLIC BLOOD PRESSURE: 74 MMHG | HEIGHT: 69 IN | BODY MASS INDEX: 40.89 KG/M2

## 2025-02-21 DIAGNOSIS — Z79.899 HIGH RISK MEDICATION USE: Primary | ICD-10-CM

## 2025-02-21 DIAGNOSIS — T84.89XD TEAR OF ANTERIOR CRUCIATE LIGAMENT GRAFT, SUBSEQUENT ENCOUNTER: ICD-10-CM

## 2025-02-21 PROBLEM — B34.9 VIRAL DISEASE: Status: RESOLVED | Noted: 2024-06-25 | Resolved: 2025-02-21

## 2025-02-21 PROBLEM — T40.1X1A HEROIN OVERDOSE: Status: RESOLVED | Noted: 2021-08-02 | Resolved: 2025-02-21

## 2025-02-21 RX ORDER — IBUPROFEN 800 MG/1
800 TABLET, FILM COATED ORAL EVERY 8 HOURS PRN
Qty: 100 TABLET | Refills: 0 | OUTPATIENT
Start: 2025-02-21

## 2025-02-21 RX ORDER — GABAPENTIN 400 MG/1
400 CAPSULE ORAL 3 TIMES DAILY
Qty: 90 CAPSULE | Refills: 2 | OUTPATIENT
Start: 2025-02-21

## 2025-02-21 NOTE — PROGRESS NOTES
"     Acute Office Visit      Date: 2025  Patient Name: Armaan Ivy  : 1985   MRN: [id number]     Chief Complaint   Patient presents with    Med Refill     Would like a MRI       History of Present Illness: Armaan Ivy is a 39 y.o. male who is here today with complaints of ongoing knee pain.  He is somewhat demanding of gabapentin and a knee MRI.  He reports since his last visit in September he was discharged from his [facility] and this triggered him to be sent back to drug court.  Drug court been sent him to rehab where, per his report, he has been since his discharge on 2025.  Patient reports he was initially sent back to residential and then to rehab.  He reports having an appointment with [facility] for ongoing Suboxone treatment and his appointment with them is Tuesday at 10:30 AM.  He tells me he was given \"an injection\" prior to leaving rehab.    Discharged from [facility] and was sent back to rehab by drug court - living at home now - dc'd from rehab 25  shelter then went to rehab  Going to [facility] - injection while in tx - Tuesday at 10:30 am for suboxone   Still having cravings  Hep C positive - did not go to GI due to being in rehab    Subjective     Current Outpatient Medications   Medication Instructions    buprenorphine-naloxone (SUBOXONE) 8-2 MG per SL tablet     doxepin (SINEquan) 75 MG capsule Every 24 Hours    gabapentin (NEURONTIN) 600 mg, Oral, 3 Times Daily    hydrOXYzine pamoate (VISTARIL) 50 mg, Oral, 3 Times Daily PRN    ibuprofen (ADVIL,MOTRIN) 800 mg, Oral, Every 6 Hours PRN       Allergies   Allergen Reactions    Codeine GI Intolerance    Mobic [Meloxicam] Diarrhea    Robaxin [Methocarbamol] Diarrhea       Objective     Vitals:    25 1044   BP: 120/74   BP Location: Left arm   Patient Position: Sitting   Cuff Size: Large Adult   Pulse: 83   Resp: 18   Temp: 98 °F (36.7 °C)   TempSrc: Oral   SpO2: 98%   Weight: 125 kg (276 lb 1.6 oz)   Height: 175.3 cm " "(69.02\")   PainSc: 7    PainLoc: Leg       Body mass index is 40.75 kg/m².     Physical Exam  Vitals and nursing note reviewed.   Constitutional:       General: He is not in acute distress.     Appearance: Normal appearance. He is not toxic-appearing.   HENT:      Head: Normocephalic.   Eyes:      Pupils: Pupils are equal, round, and reactive to light.   Cardiovascular:      Rate and Rhythm: Normal rate and regular rhythm.      Heart sounds: No murmur heard.  Pulmonary:      Effort: Pulmonary effort is normal. No respiratory distress.      Breath sounds: Normal breath sounds.   Musculoskeletal:         General: Normal range of motion.      Cervical back: Normal range of motion and neck supple.      Right lower leg: No edema.      Left lower leg: No edema.   Skin:     General: Skin is warm and dry.      Comments: Multiple tattoos and piercings    Neurological:      Mental Status: He is alert and oriented to person, place, and time.   Psychiatric:         Mood and Affect: Mood normal.         Speech: Speech is rapid and pressured.         Behavior: Behavior normal.         Thought Content: Thought content does not include suicidal ideation.         Results for orders placed or performed in visit on 09/17/24   POC Urine Drug Screen, Triage    Collection Time: 09/17/24  3:19 PM    Specimen: Urine   Result Value Ref Range    Methamphetaine Screen, Urine Negative Negative    POC Amphetamines Negative Negative    Barbiturates Screen Negative Negative    Benzodiazepine Screen Negative Negative    Cocaine Screen Negative Negative    Methadone Screen Negative Negative    Opiate Screen Negative Negative    Oxycodone, Screen Negative Negative    BUPRENORPHINE positive (POSITIVE) negative    THC, Screen Negative Negative    PCP negative         The ASCVD Risk score (Moisés COLLADO, et al., 2019) failed to calculate for the following reasons:    The 2019 ASCVD risk score is only valid for ages 40 to 79             Assessment / Plan  " "    Assessment & Plan  High risk medication use  Medical assistant reports patient was very anxious to complete urine drug screen upon arrival and did not want to wait until evaluated by provider.    Sample appeared dark/\"tea colored\" with initial temperature < 95 °F as recorded by temperature strip on tox assure cup.    After 5 minutes no temperature detected.    Patient was asked to provide another sample with the statement that his initial sample was \"inadequate for testing\".   Pt became more confrontational when discussing previous prescription for gabapentin and after discussion he states he \"is going to find another doctor\".  Patient walked out of his appointment stating he was planning to find an alternative primary care provider.       Tear of anterior cruciate ligament graft, subsequent encounter  History of chronic knee pain  Patient initially establish care and was requesting gabapentin for chronic knee pain  He was somewhat lost to follow-up and today tells me follow-up was delayed due to recent incarceration and stay in rehab facility  We discussed CSA and patient agrees to UDS today which was collected immediately upon arrival today but sample was not adequate for testing  No follow-up UDS was completed prior to patient leaving his appointment         Follow Up:   No follow-ups on file.    Patient Education:   Reviewed medications, potential side effects and signs and symptoms to report.   Discussed risk vs benefits of treatment plan with patient including medications, labs, and imaging.    Patient education materials attached to AVS and reviewed with patient during office visit today.   Addressed questions and concerns during visit. Patient verbalized understanding and agrees with tx plan.   Instructed to call the office with any questions and report to ER with any life-threatening symptoms.    [facility]as [facility]  [address] [facility] " [facility]  [facility] [address]  [phone]    NOTE TO PATIENT:   The 21st Century Cures Act makes medical notes like these available to patients in the interest of transparency. However, be advised this is a medical document. It is intended as peer to peer communication. It is written in medical language and may contain abbreviations or verbiage that are unfamiliar. It may appear blunt or direct. Medical documents are intended to carry relevant information, facts as evident, and the clinical opinion of the practitioner.     EMR Dragon/Transcription disclaimer:   Much of this encounter note is an electronic transcription of spoken language to printed text. Electronic transcription of spoken language may permit erroneous, or at times, nonsensical words or phrases to be inadvertently transcribed. Although I have reviewed the note for such errors, some may still exist.

## 2025-02-21 NOTE — LETTER
Controlled Substance Prescribing Agreement          I, Armaan Ivy [PATIENT],  1985 [] a patient of  Lupe Duarte APRN   [PROVIDER] at [facility] [PRACTICE], have been informed that  individuals who are prescribed certain Controlled Substances including, but not limited to, narcotic pain medicines, stimulants, benzodiazepine tranquilizers, and barbiturate sedatives, can abuse those substances or may allow abuse by others, and have some risk of developing an addictive disorder or suffering a relapse of a prior addiction. Therefore, I have been informed that it is necessary to observe strict rules pertaining to their use, and I agree to follow the terms and procedures described in this Agreement as consideration for, and as a condition of, the willingness of the physician whose signature appears below to consider prescribing or to continue prescribing Controlled Substances to treat my pain.     1. I will inform my physician of any current or past substance abuse, or any current or past substance abuse of any immediate member of my immediate family.     2. I agree that I may be subject to a voluntary evaluation by psychologists and/or psychiatrists, possibly at my own expense, before any Controlled Substances will be prescribed to me. I agree that the need to be evaluated by psychologists and/or psychiatrists may be revisited every three (3) to six (6) months thereafter while taking the medication.     3. All Controlled Substances must come from a provider in the PROVIDER’S PRACTICE. My Controlled Substances will come from the PROVIDER whose signature appears below, or during his or her absence, by the covering provider, unless specific written authorization is obtained from the office for an exception.     4. I will obtain all Controlled Substances from the same pharmacy. Should the need arise to change pharmacies, I will inform the PROVIDER’S office.     5. I  will inform the PROVIDER’S office of any new medications or medical conditions, and of any adverse effects I experience from any of the medications that I take.     6. I will inform my other health care providers that I am taking the Controlled Substances listed above, and of the existence of this Agreement. In the event of an emergency, I will provide the foregoing information to emergency department providers.     7. I agree that my prescribing PROVIDER has permission to discuss all diagnostic and treatment details with other health care providers, pharmacists, or other professionals who provide my health care regarding my use of Controlled Substances for purposes of maintaining accountability.     8. I will not allow anyone else to have, use sell, or otherwise have access to these medications. The sharing of medications with anyone is absolutely forbidden and is against the law.         9. I understand that Controlled Substances may be hazardous or lethal to a person who is not tolerant to their effects, especially a child, and that I must keep them out of reach of such people for their own safety.     10. I understand that tampering with a written prescription is a felony and I will not change or tamper with the PROVIDER’S written prescription.     11. I am aware that attempting to obtain a Controlled Substance under false pretenses is illegal.     12. I agree not to alter my medication in any way, and I will take my medication whole, and it will not be broken, chewed, crushed, injected, or snorted.     13. I will take my medication as instructed and prescribed, and I will not exceed the maximum prescribed dose. Any change in dosage must be approved by the PROVIDER or a physician within the PRACTICE.     14. I understand that these drugs should not be stopped abruptly, as withdrawal syndromes may develop.     15. I will cooperate with unannounced urine or serum toxicology screenings as may be requested, as well  as any random pill counts of medication by the PROVIDER. Failure to comply may result in termination of the PROVIDER-patient relationship.     16. I understand that the presence of unauthorized and/or illegal substances in the screenings described in the paragraph above may prompt referral for assessment for a substance abuse disorder or termination of the PROVIDER-patient relationship.     17. I understand that medications may not be replaced if they are lost, damaged, or stolen. If any of these situations arise that cause me to request an early refill of my medication, a copy of a filed police report or a statement from me explaining the circumstances may be required before additional prescriptions are considered. If I request an early refill secondary to lost, damaged, or stolen prescriptions twice within a year, I may be discharged from the practice.     18. I understand that a prescription may be given early if the PROVIDER or the patient will be out of town when the refill is due. These prescriptions will contain instructions to the pharmacist that the prescriptions(s) may not be filled prior to the appropriate date.     19. If the responsible legal authorities have questions concerning my treatment, as may occur, for example, if I obtained medication at several pharmacies, all confidentiality is waived, and these authorities may be given full access to my full records of Controlled Substances administration.     20. I will keep my scheduled appointments in order to receive medication renewals. If I need to cancel my appointment, I will do so a minimum of twenty-four (24) hours before it is scheduled.     21. I understand that I may be asked to bring my medications in their original container to the PROVIDER’s office while I am on controlled medication.     22. Refills generally will not be given over the phone, after office hours, during the weekends, and on holidays.     23. I understand that any medical  treatment is initially a trial, with the goal of treatment being to improve the quality of life and ability to function and/or work. These parameters will be assessed periodically to determine the benefits of continued therapy, and continued prescription is contingent on whether my physician believes that the medication usage benefits me. I will comply with all treatments as outlined by the PROVIDER.     24. I have been explained the risks and potential benefits of these therapies, including, but not limited to, psychological addiction, physical dependence, withdrawal and over dosage.     25. I understand that failure to adhere to these policies and/or failure to comply with the PROVIDER’S treatment plan may result in cessation of therapy with Controlled Substance prescribing by the PROVIDER or referral for further specialty assessment, as well as possible discharge from the PRACTICE.     26. I, the undersigned patient, attest that the foregoing was discussed with me, and that I have read, fully understand, and agree to all of the above requirements and instructions. I affirm that I have the full right and power to sign and be bound by this  Agreement.         Date:  __________________________________________    Patient Printed Name:  _____________________________    Patient Signature:  ________________________________           Date:  __________________________________________    Provider Signature:  _______________________________

## 2025-02-21 NOTE — ASSESSMENT & PLAN NOTE
"Medical assistant reports patient was very anxious to complete urine drug screen upon arrival and did not want to wait until evaluated by provider.    Sample appeared dark/\"tea colored\" with initial temperature < 95 °F as recorded by temperature strip on tox assure cup.    After 5 minutes no temperature detected.    Patient was asked to provide another sample with the statement that his initial sample was \"inadequate for testing\".   Pt became more confrontational when discussing previous prescription for gabapentin and after discussion he states he \"is going to find another doctor\".  Patient walked out of his appointment stating he was planning to find an alternative primary care provider.       "

## 2025-03-04 ENCOUNTER — OFFICE VISIT (OUTPATIENT)
Dept: FAMILY MEDICINE CLINIC | Facility: CLINIC | Age: 40
End: 2025-03-04
Payer: MEDICAID

## 2025-03-04 VITALS
BODY MASS INDEX: 41.03 KG/M2 | HEIGHT: 69 IN | TEMPERATURE: 96.9 F | OXYGEN SATURATION: 98 % | SYSTOLIC BLOOD PRESSURE: 124 MMHG | HEART RATE: 90 BPM | WEIGHT: 277 LBS | DIASTOLIC BLOOD PRESSURE: 70 MMHG

## 2025-03-04 DIAGNOSIS — Z72.0 TOBACCO USE: ICD-10-CM

## 2025-03-04 DIAGNOSIS — G62.9 NEUROPATHY: ICD-10-CM

## 2025-03-04 DIAGNOSIS — M25.562 CHRONIC PAIN OF LEFT KNEE: ICD-10-CM

## 2025-03-04 DIAGNOSIS — Z79.899 HIGH RISK MEDICATION USE: ICD-10-CM

## 2025-03-04 DIAGNOSIS — G89.29 CHRONIC PAIN OF LEFT KNEE: ICD-10-CM

## 2025-03-04 DIAGNOSIS — F41.9 ANXIETY: Primary | ICD-10-CM

## 2025-03-04 PROCEDURE — 1160F RVW MEDS BY RX/DR IN RCRD: CPT | Performed by: PHYSICIAN ASSISTANT

## 2025-03-04 PROCEDURE — 99214 OFFICE O/P EST MOD 30 MIN: CPT | Performed by: PHYSICIAN ASSISTANT

## 2025-03-04 PROCEDURE — 1125F AMNT PAIN NOTED PAIN PRSNT: CPT | Performed by: PHYSICIAN ASSISTANT

## 2025-03-04 PROCEDURE — 1159F MED LIST DOCD IN RCRD: CPT | Performed by: PHYSICIAN ASSISTANT

## 2025-03-04 RX ORDER — GABAPENTIN 600 MG/1
600 TABLET ORAL 3 TIMES DAILY
Qty: 90 TABLET | Refills: 2 | Status: SHIPPED | OUTPATIENT
Start: 2025-03-04

## 2025-03-04 RX ORDER — IBUPROFEN 800 MG/1
800 TABLET, FILM COATED ORAL EVERY 6 HOURS PRN
Qty: 90 TABLET | Refills: 2 | Status: SHIPPED | OUTPATIENT
Start: 2025-03-04

## 2025-03-04 RX ORDER — HYDROXYZINE PAMOATE 50 MG/1
50 CAPSULE ORAL 3 TIMES DAILY PRN
Qty: 90 CAPSULE | Refills: 5 | Status: SHIPPED | OUTPATIENT
Start: 2025-03-04

## 2025-03-04 RX ORDER — CAPSAICIN 0.75 MG/G
CREAM TOPICAL EVERY 8 HOURS SCHEDULED
COMMUNITY
Start: 2025-02-24 | End: 2025-03-04

## 2025-03-04 RX ORDER — DOXEPIN HYDROCHLORIDE 75 MG/1
CAPSULE ORAL EVERY 24 HOURS
COMMUNITY
Start: 2025-02-24

## 2025-03-04 NOTE — PROGRESS NOTES
New Patient Office Visit    Date: 2025   Patient Name: Armaan Ivy  : 1985   MRN: [id number]     Chief Complaint:    Chief Complaint   Patient presents with    [facility]     Pt was seen by you 2024  Then he was in care home and then sober living.  He is here to start his medications back bc they were discontinued while he was in care home       History of Present Illness: Armaan Ivy is a 39 y.o. male who is here today to establish care.    History of Present Illness  The patient presents for evaluation of pain management, depression, and substance abuse. I saw him about a year go.    He has been experiencing persistent knee pain due to an ACL injury, which he attributes to his work at [email or web address]. He has a referral to see orthopedics. He also reports swelling in his leg and occasional bleeding from two small spots on his leg, which have been present for approximately 6 to 7 months. He speculates that these symptoms may be related to a screw in his leg. He is not experiencing any chest pain. He is currently on a regimen of ibuprofen 200 mg, taking 8 to 10 tablets as needed for pain relief. He is requesting a prescription for ibuprofen 600 mg.    He has a history of substance abuse, having undergone rehabilitation twice and spent 19 months in drug court. He is actively seeking employment and is working on getting his job back. He is attending 3 to 5 meetings per week and is working on getting his job back. He is on Suboxone and is required to undergo weekly drug testing. He is also on an antidepressant, with only 5 days' supply remaining. He recalls a previous episode where he ran out of his medication while in sober living, which resulted in mood instability and discomfort until he was able to resume the medication after a month. He has gained 100 pounds during his time in rehab.    He has a history of carpal tunnel surgery on his right hand performed 2 years ago and is scheduled for a  "similar procedure on his left hand. He expresses concern about the potential need for narcotics post-surgery, given his history of substance abuse.    SOCIAL HISTORY  He admits to past drug use and is currently in recovery. He is working on getting his job back at [employer].    FAMILY HISTORY  His father passed away in February 2024.    MEDICATIONS  Suboxone, ibuprofen, gabapentin    Subjective    Review of Systems:   Review of Systems     Past Medical History:   Past Medical History:   Diagnosis Date    Anxiety     Depression     Hepatitis C antibody positive in blood     Heroin overdose 08/02/2021    Swimmer's ear     Viral disease 06/25/2024       Past Surgical History:   Past Surgical History:   Procedure Laterality Date    KNEE SURGERY Left        Family History:   Family History   Problem Relation Age of Onset    COPD Mother     Hypertension Father     Heart disease Father     Cancer Father     Obesity Sister     Diabetes Maternal Grandfather     Diabetes Paternal Grandmother     Cancer Paternal Grandmother        Social History:   Social History     Socioeconomic History    Marital status:    Tobacco Use    Smoking status: Every Day     Current packs/day: 1.00     Average packs/day: 1 pack/day for 29.2 years (29.2 ttl pk-yrs)     Types: Cigarettes     Start date: 1996     Passive exposure: Current    Smokeless tobacco: Never   Vaping Use    Vaping status: Some Days    Start date: 4/8/2024    Substances: Nicotine    Devices: Disposable   Substance and Sexual Activity    Alcohol use: Not Currently    Drug use: Not Currently     Types: Methamphetamines, Codeine, \"Crack\" cocaine    Sexual activity: Not Currently       Medications:     Current Outpatient Medications:     buprenorphine-naloxone (SUBOXONE) 8-2 MG per SL tablet, , Disp: , Rfl:     doxepin (SINEquan) 75 MG capsule, Daily., Disp: , Rfl:     hydrOXYzine pamoate (VISTARIL) 50 MG capsule, Take 1 capsule by mouth 3 (Three) Times a Day As Needed for " "Anxiety., Disp: 90 capsule, Rfl: 5    gabapentin (NEURONTIN) 600 MG tablet, Take 1 tablet by mouth 3 (Three) Times a Day., Disp: 90 tablet, Rfl: 2    ibuprofen (ADVIL,MOTRIN) 800 MG tablet, Take 1 tablet by mouth Every 6 (Six) Hours As Needed for Mild Pain or Moderate Pain., Disp: 90 tablet, Rfl: 2    Allergies:   Allergies   Allergen Reactions    Codeine GI Intolerance    Mobic [Meloxicam] Diarrhea    Robaxin [Methocarbamol] Diarrhea       Objective   Vital Signs:   Vitals:    03/04/25 1616   BP: 124/70   Pulse: 90   Temp: 96.9 °F (36.1 °C)   TempSrc: Infrared   SpO2: 98%   Weight: 126 kg (277 lb)   Height: 175.3 cm (69.02\")   PainSc: 8      Body mass index is 40.89 kg/m².          Physical Exam:   Physical Exam  Vitals and nursing note reviewed.   Constitutional:       Appearance: Normal appearance.   HENT:      Head: Normocephalic and atraumatic.      Right Ear: Tympanic membrane and ear canal normal.      Left Ear: Tympanic membrane and ear canal normal.      Nose: No congestion or rhinorrhea.      Mouth/Throat:      Mouth: Mucous membranes are moist.      Pharynx: Oropharynx is clear. No posterior oropharyngeal erythema.   Cardiovascular:      Rate and Rhythm: Normal rate and regular rhythm.   Pulmonary:      Effort: Pulmonary effort is normal.      Breath sounds: Normal breath sounds. No decreased breath sounds, wheezing, rhonchi or rales.   Musculoskeletal:      Cervical back: Neck supple.      Left knee: Tenderness present over the medial joint line and lateral joint line.      Right lower leg: No edema.      Left lower leg: No edema.   Lymphadenopathy:      Cervical: No cervical adenopathy.   Neurological:      Mental Status: He is alert.          Procedures     Assessment / Plan    Assessment/Plan:   Diagnoses and all orders for this visit:    1. Anxiety (Primary)  -     hydrOXYzine pamoate (VISTARIL) 50 MG capsule; Take 1 capsule by mouth 3 (Three) Times a Day As Needed for Anxiety.  Dispense: 90 capsule; " Refill: 5    2. Neuropathy  -     gabapentin (NEURONTIN) 600 MG tablet; Take 1 tablet by mouth 3 (Three) Times a Day.  Dispense: 90 tablet; Refill: 2    3. Chronic pain of left knee  -     gabapentin (NEURONTIN) 600 MG tablet; Take 1 tablet by mouth 3 (Three) Times a Day.  Dispense: 90 tablet; Refill: 2  -     ibuprofen (ADVIL,MOTRIN) 800 MG tablet; Take 1 tablet by mouth Every 6 (Six) Hours As Needed for Mild Pain or Moderate Pain.  Dispense: 90 tablet; Refill: 2    4. High risk medication use  -     Compliance Drug Analysis, Ur - Urine, Clean Catch; Future  -     Compliance Drug Analysis, Ur - Urine, Clean Catch    5. Tobacco use       Assessment & Plan  1. Pain management.  He reports significant pain in his knee, likely due to an ACL injury. He has been advised to schedule an appointment with an orthopedic specialist for further evaluation and management. A prescription for ibuprofen 600 mg has been provided to manage his pain. He has been cautioned about the potential gastrointestinal side effects of ibuprofen. For his neuropathy will give him gabapentin 600mg tid. UDS and CSA done today. IVAN requested.    2. Depression.  He is currently on an antidepressant and has only 5 days left of his medication. A prescription refill for his antidepressant has been sent to [facility] in [facility]. He has been advised to continue his current dosage and not to run out of the medication to avoid mood instability.    3. Substance abuse.  He is currently on Suboxone and is required to attend weekly drug court meetings and undergo frequent drug testing. He has been advised to continue his Suboxone treatment. .    4. Carpal tunnel syndrome.  He has a history of carpal tunnel surgery on his right hand and needs to schedule surgery for his left hand. He has been advised to follow up with his orthopedic specialist to discuss the surgical plan and postoperative pain management, considering his history of  substance abuse.    Follow-up  The patient will follow up in 3 months. Discussed gabapentin is not a narcotic but is a controlled substance, risks, benefits, and cautions with this medication.     PROCEDURE  The patient underwent carpal tunnel surgery on his right hand 2 years ago.          Follow Up:   Return in about 3 months (around 6/4/2025) for Recheck.    Patient or patient representative verbalized consent for the use of Ambient Listening during the visit with  Katharina Rodas PA-C for chart documentation. 3/16/2025  02:39 EDT    Katharina Rodas PA-C   [facility]

## 2025-03-05 NOTE — ASSESSMENT & PLAN NOTE
History of chronic knee pain  Patient initially establish care and was requesting gabapentin for chronic knee pain  He was somewhat lost to follow-up and today tells me follow-up was delayed due to recent incarceration and stay in rehab facility  We discussed CSA and patient agrees to UDS today which was collected immediately upon arrival today but sample was not adequate for testing  No follow-up UDS was completed prior to patient leaving his appointment

## 2025-03-11 LAB — DRUGS UR: NORMAL
